# Patient Record
Sex: FEMALE | Race: OTHER | Employment: OTHER | URBAN - METROPOLITAN AREA
[De-identification: names, ages, dates, MRNs, and addresses within clinical notes are randomized per-mention and may not be internally consistent; named-entity substitution may affect disease eponyms.]

---

## 2019-05-01 NOTE — PATIENT DISCUSSION
"""Call if vision decreases or RD warning signs increases/RD warnings given.  No signs of right eye ""

## 2022-07-08 RX ORDER — GABAPENTIN 100 MG/1
1 CAPSULE ORAL
COMMUNITY
End: 2022-09-01

## 2022-07-08 RX ORDER — ONDANSETRON 4 MG/1
TABLET, ORALLY DISINTEGRATING ORAL
COMMUNITY
Start: 2021-09-21 | End: 2022-09-01

## 2022-07-08 RX ORDER — ATORVASTATIN CALCIUM 20 MG/1
TABLET, FILM COATED ORAL
COMMUNITY

## 2022-07-08 RX ORDER — ALPRAZOLAM 1 MG/1
TABLET ORAL
COMMUNITY
Start: 2022-01-06 | End: 2022-09-01

## 2022-07-27 PROBLEM — M54.50 LOWER BACK PAIN: Status: ACTIVE | Noted: 2022-07-27

## 2022-07-27 PROBLEM — G89.29 OTHER CHRONIC PAIN: Status: ACTIVE | Noted: 2022-07-27

## 2022-07-27 PROBLEM — M51.26 PROTRUSION OF LUMBAR INTERVERTEBRAL DISC: Status: ACTIVE | Noted: 2022-07-27

## 2022-07-27 PROBLEM — M77.8 CAPSULITIS OF RIGHT SHOULDER: Status: ACTIVE | Noted: 2022-07-27

## 2022-07-27 PROBLEM — M51.26 HERNIATED NUCLEUS PULPOSUS, LUMBAR: Status: ACTIVE | Noted: 2022-07-27

## 2022-07-27 PROBLEM — J32.9 CHRONIC SINUSITIS: Status: ACTIVE | Noted: 2022-07-27

## 2022-07-27 PROBLEM — R51.9 HEADACHE: Status: ACTIVE | Noted: 2022-07-27

## 2022-07-27 PROBLEM — U07.1 COVID-19: Status: ACTIVE | Noted: 2022-07-27

## 2022-07-27 PROBLEM — E78.5 HYPERLIPIDEMIA LDL GOAL <100: Status: ACTIVE | Noted: 2022-07-27

## 2022-07-27 PROBLEM — M54.41 LUMBAGO WITH SCIATICA, RIGHT SIDE: Status: ACTIVE | Noted: 2022-07-27

## 2022-07-27 PROBLEM — E11.65 TYPE 2 DIABETES MELLITUS WITH HYPERGLYCEMIA, WITHOUT LONG-TERM CURRENT USE OF INSULIN (HCC): Status: ACTIVE | Noted: 2022-07-27

## 2022-07-27 PROBLEM — B37.31 CANDIDA VAGINITIS: Status: ACTIVE | Noted: 2022-07-27

## 2022-07-27 PROBLEM — M19.011 PRIMARY OSTEOARTHRITIS, RIGHT SHOULDER: Status: ACTIVE | Noted: 2022-07-27

## 2022-07-27 PROBLEM — E78.6 HYPOCHOLESTEREMIA: Status: ACTIVE | Noted: 2022-07-27

## 2022-07-27 PROBLEM — R26.89 BALANCE PROBLEMS: Status: ACTIVE | Noted: 2022-07-27

## 2022-07-27 PROBLEM — I10 ESSENTIAL HYPERTENSION: Status: ACTIVE | Noted: 2022-07-27

## 2022-07-27 PROBLEM — F41.9 ANXIETY: Status: ACTIVE | Noted: 2022-07-27

## 2022-07-27 PROBLEM — M19.90 ARTHRITIS: Status: ACTIVE | Noted: 2022-07-27

## 2023-01-03 ENCOUNTER — NEW PATIENT (OUTPATIENT)
Dept: URBAN - METROPOLITAN AREA CLINIC 4 | Facility: CLINIC | Age: 53
End: 2023-01-03

## 2023-01-03 DIAGNOSIS — H43.813: ICD-10-CM

## 2023-01-03 DIAGNOSIS — H25.13: ICD-10-CM

## 2023-01-03 DIAGNOSIS — E11.9: ICD-10-CM

## 2023-01-03 DIAGNOSIS — H40.1131: ICD-10-CM

## 2023-01-03 PROCEDURE — 92134 CPTRZ OPH DX IMG PST SGM RTA: CPT

## 2023-01-03 PROCEDURE — 99204 OFFICE O/P NEW MOD 45 MIN: CPT

## 2023-01-03 PROCEDURE — 92250 FUNDUS PHOTOGRAPHY W/I&R: CPT

## 2023-01-03 PROCEDURE — 92015 DETERMINE REFRACTIVE STATE: CPT

## 2023-01-03 ASSESSMENT — VISUAL ACUITY
OD_GLARE: 20/100
OS_GLARE: 20/100

## 2023-01-03 ASSESSMENT — TONOMETRY
OS_IOP_MMHG: 21
OD_IOP_MMHG: 23

## 2023-01-27 ENCOUNTER — CLINIC PROCEDURE ONLY (OUTPATIENT)
Dept: URBAN - METROPOLITAN AREA CLINIC 4 | Facility: CLINIC | Age: 53
End: 2023-01-27

## 2023-01-27 DIAGNOSIS — H40.1131: ICD-10-CM

## 2023-01-27 PROCEDURE — 65855 TRABECULOPLASTY LASER SURG: CPT

## 2023-01-27 ASSESSMENT — TONOMETRY: OD_IOP_MMHG: 24

## 2023-04-19 ENCOUNTER — ESTABLISHED PATIENT (OUTPATIENT)
Dept: URBAN - METROPOLITAN AREA CLINIC 4 | Facility: CLINIC | Age: 53
End: 2023-04-19

## 2023-04-19 DIAGNOSIS — H40.1131: ICD-10-CM

## 2023-04-19 PROCEDURE — 99213 OFFICE O/P EST LOW 20 MIN: CPT

## 2023-04-19 ASSESSMENT — VISUAL ACUITY
OS_CC: 20/50
OS_PH: 20/40
OU_CC: 20/40
OD_CC: 20/40

## 2023-04-19 ASSESSMENT — TONOMETRY
OD_IOP_MMHG: 17
OS_IOP_MMHG: 13

## 2023-09-21 ENCOUNTER — ESTABLISHED PATIENT (OUTPATIENT)
Dept: URBAN - METROPOLITAN AREA CLINIC 4 | Facility: CLINIC | Age: 53
End: 2023-09-21

## 2023-09-21 DIAGNOSIS — H40.1131: ICD-10-CM

## 2023-09-21 PROCEDURE — 92012 INTRM OPH EXAM EST PATIENT: CPT

## 2023-09-21 ASSESSMENT — TONOMETRY
OS_IOP_MMHG: 11
OD_IOP_MMHG: 13

## 2023-09-21 ASSESSMENT — VISUAL ACUITY
OS_CC: 20/40
OS_CC: J1
OD_CC: 20/40-2
OD_CC: J1

## 2024-03-07 ENCOUNTER — ESTABLISHED PATIENT (OUTPATIENT)
Facility: LOCATION | Age: 54
End: 2024-03-07

## 2024-03-07 DIAGNOSIS — H43.813: ICD-10-CM

## 2024-03-07 DIAGNOSIS — E11.9: ICD-10-CM

## 2024-03-07 DIAGNOSIS — H25.13: ICD-10-CM

## 2024-03-07 DIAGNOSIS — H40.1131: ICD-10-CM

## 2024-03-07 PROCEDURE — 92014 COMPRE OPH EXAM EST PT 1/>: CPT

## 2024-03-07 PROCEDURE — 92134 CPTRZ OPH DX IMG PST SGM RTA: CPT

## 2024-03-07 PROCEDURE — 92015 DETERMINE REFRACTIVE STATE: CPT

## 2024-03-07 PROCEDURE — 92250 FUNDUS PHOTOGRAPHY W/I&R: CPT

## 2024-03-07 ASSESSMENT — VISUAL ACUITY
OD_CC: 20/25
OS_CC: 20/40
OD_GLARE: 20/70
OS_GLARE: 20/70

## 2024-03-07 ASSESSMENT — TONOMETRY
OS_IOP_MMHG: 18
OD_IOP_MMHG: 15

## 2024-03-22 ENCOUNTER — ESTABLISHED PATIENT (OUTPATIENT)
Facility: LOCATION | Age: 54
End: 2024-03-22

## 2024-03-22 DIAGNOSIS — H25.13: ICD-10-CM

## 2024-03-22 DIAGNOSIS — H40.1131: ICD-10-CM

## 2024-03-22 ASSESSMENT — VISUAL ACUITY
OU_SC: J1+
OS_SC: 20/400
OD_SC: 20/400

## 2024-04-30 ENCOUNTER — ESTABLISHED PATIENT (OUTPATIENT)
Facility: LOCATION | Age: 54
End: 2024-04-30

## 2024-04-30 DIAGNOSIS — H40.1131: ICD-10-CM

## 2024-04-30 DIAGNOSIS — Z98.890: ICD-10-CM

## 2024-04-30 PROCEDURE — 99213 OFFICE O/P EST LOW 20 MIN: CPT

## 2024-04-30 PROCEDURE — 92133 CPTRZD OPH DX IMG PST SGM ON: CPT

## 2024-04-30 ASSESSMENT — VISUAL ACUITY
OD_CC: 20/40-1
OS_CC: 20/40-1
OU_CC: 20/40

## 2024-04-30 ASSESSMENT — TONOMETRY
OS_IOP_MMHG: 14
OD_IOP_MMHG: 16

## 2024-05-03 ENCOUNTER — DIAGNOSTICS ONLY (OUTPATIENT)
Facility: LOCATION | Age: 54
End: 2024-05-03

## 2024-05-03 DIAGNOSIS — H40.1131: ICD-10-CM

## 2024-05-03 PROCEDURE — 92083 EXTENDED VISUAL FIELD XM: CPT

## 2024-06-07 ENCOUNTER — DIAGNOSTICS ONLY (OUTPATIENT)
Facility: LOCATION | Age: 54
End: 2024-06-07

## 2024-06-07 DIAGNOSIS — H40.1131: ICD-10-CM

## 2024-06-07 PROCEDURE — 92081 LIMITED VISUAL FIELD XM: CPT

## 2025-03-17 ENCOUNTER — COMPREHENSIVE EXAM (OUTPATIENT)
Age: 55
End: 2025-03-17

## 2025-03-17 DIAGNOSIS — H40.1131: ICD-10-CM

## 2025-03-17 DIAGNOSIS — H25.13: ICD-10-CM

## 2025-03-17 DIAGNOSIS — H43.813: ICD-10-CM

## 2025-03-17 DIAGNOSIS — E11.9: ICD-10-CM

## 2025-03-17 PROCEDURE — 92014 COMPRE OPH EXAM EST PT 1/>: CPT

## 2025-03-17 PROCEDURE — 92134 CPTRZ OPH DX IMG PST SGM RTA: CPT | Mod: NC

## 2025-03-17 PROCEDURE — 76514 ECHO EXAM OF EYE THICKNESS: CPT

## 2025-03-17 PROCEDURE — 92133 CPTRZD OPH DX IMG PST SGM ON: CPT

## 2025-03-17 PROCEDURE — 92015 DETERMINE REFRACTIVE STATE: CPT

## 2025-03-26 ENCOUNTER — PRE-OP/H&P (OUTPATIENT)
Age: 55
End: 2025-03-26

## 2025-03-26 DIAGNOSIS — H25.13: ICD-10-CM

## 2025-03-26 PROCEDURE — 92136 OPHTHALMIC BIOMETRY: CPT

## 2025-03-26 PROCEDURE — 99211PRE PRE OP VISIT
